# Patient Record
Sex: FEMALE | Race: WHITE
[De-identification: names, ages, dates, MRNs, and addresses within clinical notes are randomized per-mention and may not be internally consistent; named-entity substitution may affect disease eponyms.]

---

## 2020-10-03 ENCOUNTER — HOSPITAL ENCOUNTER (OUTPATIENT)
Dept: HOSPITAL 76 - EMS | Age: 55
Discharge: ANOTHER HOSPITAL | End: 2020-10-03
Attending: SURGERY
Payer: COMMERCIAL

## 2020-10-03 DIAGNOSIS — R00.2: Primary | ICD-10-CM

## 2024-09-15 ENCOUNTER — HOSPITAL ENCOUNTER (OUTPATIENT)
Dept: HOSPITAL 76 - DI | Age: 59
Discharge: HOME | End: 2024-09-15
Attending: NURSE PRACTITIONER
Payer: COMMERCIAL

## 2024-09-15 DIAGNOSIS — R93.89: Primary | ICD-10-CM

## 2024-09-16 NOTE — ULTRASOUND REPORT
PROCEDURE:  Pelvic w/Transvaginal

 

INDICATIONS:  POST MENOPAUSAL

 

TECHNIQUE:  

Real-time scanning was performed of the pelvic organs, with image documentation.  Additional endovagi
nal scanning was necessary due to incomplete visualization of the adnexal and endometrial structures 
by transabdominal scanning.

 

COMPARISON:  None.

 

FINDINGS:  

 

Uterus:  Uterus is anteverted and normal in size at 7.0 x 2.7 x 3.6 cm.  The myometrium is heterogene
ous.  The endometrium measures 16 mm in combined thickness.  Endometrium is heterogeneous in echotext
ure. No discrete endometrial mass or fluid is seen.

 

Ovaries:  The right ovary is not visualized. The left ovary measures 2.6 x 1.2 x 1.2 cm, with a calcu
lated ovarian volume of 2.07 cc. Less than 12 follicles can be seen in left ovary.  No adnexal masses
 are seen. No cystic lesions measuring greater than 3 cm. 

 

Other:  No pathologic free abdominal or pelvic fluid.

 

 

IMPRESSION:  

 

1. Heterogeneous myometrium without discrete uterine fibroid. Thickened endometrium with heterogeneou
s echotexture concerning for endometrial hyperplasia. No definite endometrial mass or fluid is seen. 
GYN correlation is recommended.

2. Normal-appearing left ovary. Right ovary is not visualized. No gross adnexal mass.

 

 

 

Reviewed by: Niles Polanco MD on 9/16/2024 1:57 PM PDT

Approved by: Niles Polanco MD on 9/16/2024 1:57 PM PDT

 

 

Station ID:  [id number]